# Patient Record
Sex: MALE | Race: ASIAN | Employment: FULL TIME | URBAN - METROPOLITAN AREA
[De-identification: names, ages, dates, MRNs, and addresses within clinical notes are randomized per-mention and may not be internally consistent; named-entity substitution may affect disease eponyms.]

---

## 2018-03-26 PROBLEM — L71.9 ACNE ROSACEA: Status: ACTIVE | Noted: 2018-01-15

## 2018-03-26 PROBLEM — G47.33 OSA (OBSTRUCTIVE SLEEP APNEA): Status: ACTIVE | Noted: 2017-06-21

## 2018-04-02 ENCOUNTER — APPOINTMENT (OUTPATIENT)
Dept: OTHER | Facility: HOSPITAL | Age: 58
End: 2018-04-02
Attending: PREVENTIVE MEDICINE

## 2019-05-13 PROBLEM — M79.10 MYALGIA: Status: ACTIVE | Noted: 2019-05-13

## 2019-10-14 ENCOUNTER — OFFICE VISIT (OUTPATIENT)
Dept: SURGERY | Facility: CLINIC | Age: 59
End: 2019-10-14
Payer: COMMERCIAL

## 2019-10-14 VITALS
SYSTOLIC BLOOD PRESSURE: 121 MMHG | OXYGEN SATURATION: 98 % | HEART RATE: 63 BPM | DIASTOLIC BLOOD PRESSURE: 79 MMHG | RESPIRATION RATE: 16 BRPM | WEIGHT: 138 LBS | BODY MASS INDEX: 22 KG/M2

## 2019-10-14 DIAGNOSIS — R74.8 ELEVATED LIVER ENZYMES: ICD-10-CM

## 2019-10-14 DIAGNOSIS — K76.0 FATTY LIVER: Primary | ICD-10-CM

## 2019-10-14 NOTE — PROGRESS NOTES
[facility] at [facility]  [address], [address]  [address]  [facility]  [phone] ([phone]) systems reviewed and are negative.      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

## 2019-12-02 PROBLEM — M79.10 MYALGIA: Status: RESOLVED | Noted: 2019-05-13 | Resolved: 2019-12-02

## 2019-12-02 PROBLEM — E78.2 MIXED HYPERLIPIDEMIA: Status: ACTIVE | Noted: 2019-12-02

## 2020-06-18 PROBLEM — L71.9 ACNE ROSACEA: Status: RESOLVED | Noted: 2018-01-15 | Resolved: 2020-06-18

## 2021-07-07 PROBLEM — N50.812 PAIN IN LEFT TESTICLE: Status: ACTIVE | Noted: 2021-07-07
